# Patient Record
Sex: FEMALE | Race: WHITE | Employment: FULL TIME | URBAN - METROPOLITAN AREA
[De-identification: names, ages, dates, MRNs, and addresses within clinical notes are randomized per-mention and may not be internally consistent; named-entity substitution may affect disease eponyms.]

---

## 2024-06-07 PROBLEM — K80.20 CHOLELITHIASIS WITHOUT OBSTRUCTION: Status: ACTIVE | Noted: 2021-09-15

## 2024-06-07 PROBLEM — Z87.19 HISTORY OF GALLSTONES: Status: ACTIVE | Noted: 2021-09-15

## 2024-06-07 PROBLEM — O09.92 HIGH-RISK PREGNANCY IN SECOND TRIMESTER: Status: ACTIVE | Noted: 2024-06-07

## 2024-06-07 PROBLEM — O99.212 OBESITY AFFECTING PREGNANCY IN SECOND TRIMESTER: Status: ACTIVE | Noted: 2024-06-07

## 2024-06-07 PROBLEM — O99.012 ANEMIA AFFECTING PREGNANCY IN SECOND TRIMESTER: Status: ACTIVE | Noted: 2024-06-07

## 2024-06-19 ENCOUNTER — ROUTINE PRENATAL (OUTPATIENT)
Dept: OBGYN CLINIC | Age: 29
End: 2024-06-19
Payer: COMMERCIAL

## 2024-06-19 VITALS
DIASTOLIC BLOOD PRESSURE: 75 MMHG | SYSTOLIC BLOOD PRESSURE: 123 MMHG | BODY MASS INDEX: 34.41 KG/M2 | WEIGHT: 187 LBS | HEIGHT: 62 IN

## 2024-06-19 DIAGNOSIS — O99.012 ANEMIA AFFECTING PREGNANCY IN SECOND TRIMESTER: ICD-10-CM

## 2024-06-19 DIAGNOSIS — O99.212 OBESITY AFFECTING PREGNANCY IN SECOND TRIMESTER, UNSPECIFIED OBESITY TYPE: ICD-10-CM

## 2024-06-19 DIAGNOSIS — N88.8 CERVICAL MASS: ICD-10-CM

## 2024-06-19 DIAGNOSIS — Z3A.20 20 WEEKS GESTATION OF PREGNANCY: ICD-10-CM

## 2024-06-19 DIAGNOSIS — E66.9 MILD OBESITY: ICD-10-CM

## 2024-06-19 DIAGNOSIS — O09.92 HIGH-RISK PREGNANCY IN SECOND TRIMESTER: Primary | ICD-10-CM

## 2024-06-19 DIAGNOSIS — O09.899 TWO VESSEL UMBILICAL CORD IN SINGLETON PREGNANCY, ANTEPARTUM: ICD-10-CM

## 2024-06-19 PROBLEM — K80.20 CHOLELITHIASIS WITHOUT OBSTRUCTION: Status: RESOLVED | Noted: 2021-09-15 | Resolved: 2024-06-19

## 2024-06-19 PROBLEM — Z87.19 HISTORY OF GALLSTONES: Status: RESOLVED | Noted: 2021-09-15 | Resolved: 2024-06-19

## 2024-06-19 PROCEDURE — 76827 ECHO EXAM OF FETAL HEART: CPT | Performed by: OBSTETRICS & GYNECOLOGY

## 2024-06-19 PROCEDURE — 99205 OFFICE O/P NEW HI 60 MIN: CPT | Performed by: OBSTETRICS & GYNECOLOGY

## 2024-06-19 PROCEDURE — 76825 ECHO EXAM OF FETAL HEART: CPT | Performed by: OBSTETRICS & GYNECOLOGY

## 2024-06-19 PROCEDURE — 76811 OB US DETAILED SNGL FETUS: CPT | Performed by: OBSTETRICS & GYNECOLOGY

## 2024-06-19 PROCEDURE — 76817 TRANSVAGINAL US OBSTETRIC: CPT | Performed by: OBSTETRICS & GYNECOLOGY

## 2024-06-19 PROCEDURE — 93325 DOPPLER ECHO COLOR FLOW MAPG: CPT | Performed by: OBSTETRICS & GYNECOLOGY

## 2024-06-19 RX ORDER — PANTOPRAZOLE SODIUM 40 MG/1
TABLET, DELAYED RELEASE ORAL
COMMUNITY
Start: 2024-06-11

## 2024-06-19 RX ORDER — DOCUSATE SODIUM 100 MG/1
100 CAPSULE, LIQUID FILLED ORAL 2 TIMES DAILY
COMMUNITY

## 2024-06-19 RX ORDER — CEPHALEXIN 500 MG/1
CAPSULE ORAL
COMMUNITY
Start: 2024-05-20 | End: 2024-06-19

## 2024-06-19 RX ORDER — FERRIC MALTOL 30 MG/1
CAPSULE ORAL
COMMUNITY
Start: 2024-06-15

## 2024-06-19 RX ORDER — FAMOTIDINE 20 MG/1
TABLET, FILM COATED ORAL
COMMUNITY
Start: 2024-05-22 | End: 2024-06-19

## 2024-06-19 RX ORDER — NITROFURANTOIN 25; 75 MG/1; MG/1
CAPSULE ORAL
COMMUNITY
Start: 2024-05-21

## 2024-06-19 ASSESSMENT — PATIENT HEALTH QUESTIONNAIRE - PHQ9
SUM OF ALL RESPONSES TO PHQ QUESTIONS 1-9: 0
SUM OF ALL RESPONSES TO PHQ9 QUESTIONS 1 & 2: 0
2. FEELING DOWN, DEPRESSED OR HOPELESS: NOT AT ALL
SUM OF ALL RESPONSES TO PHQ QUESTIONS 1-9: 0
1. LITTLE INTEREST OR PLEASURE IN DOING THINGS: NOT AT ALL

## 2024-06-19 NOTE — PROGRESS NOTES
ASSESSMENT/PLAN:  Patient Active Problem List    Diagnosis Date Noted    Cervical mass 2024     Overview Note:     24: Presumed cervical fibroid noted with [facility] measuring 2 cm  24: (Patient changed OB care to [facility]) Presumed cervical fibroid seen measuring 4 x 3 x 5 cm on US with [facility]  24: Colposcopy performed for ASCUS-H Pap: JERICA 1-2 noted on exam, no biopsies collected  24: US at [facility] showed mass measuring 5 x 4 x 5 cm    24 [facility]: Referral to [facility] for \"cervical fibroid.\" Cervical mass measuring 7.04 x 6.8 x 4.24 cm. Appears highly vascular. See ultrasound report for full details.           Assessment & Plan Note:          The patient reports that she has had episodes of heavy vaginal bleeding earlier in the pregnancy which was presumed to be a subchorionic hemorrhage. She also has had episodes of rectal bleeding with passing large clots which was attributed to constipation. She reports history of abnormal Pap as above and had a recent colposcopy, but she denies any history of biopsies or cervical procedures.    We discussed the enlarging posterior cervical mass. This could represent a neoplasm (of cervical or rectal origin) or AVM. Differential also includes leiomyoma, but this mass appears much more vascular than would be expected in that case. A referral to GYN/ONC is recommended for a second opinion on this mass.     We discussed that an AVM may preclude vaginal delivery and may need surgical treatment if heavy bleeding is present. We also discussed that a neoplasm could require surgery or other treatment (radiation/chemo) and with treatment and management by Oncology. The patient has no future plans for child bearing.    In any case a  delivery may be required as this highly vascular mass has involved the cervix quite extensively.    Our plan is to maintain surveillance of the cervical mass with ultrasound and to refer to GYN/ONC for a 2nd

## 2024-06-20 PROBLEM — O99.332 TOBACCO SMOKING AFFECTING PREGNANCY IN SECOND TRIMESTER: Status: ACTIVE | Noted: 2024-06-20

## 2024-06-20 NOTE — ASSESSMENT & PLAN NOTE
The patient reports that she has had episodes of heavy vaginal bleeding earlier in the pregnancy which was presumed to be a subchorionic hemorrhage. She also has had episodes of rectal bleeding with passing large clots which was attributed to constipation. She reports history of abnormal Pap as above and had a recent colposcopy, but she denies any history of biopsies or cervical procedures.    We discussed the enlarging posterior cervical mass. This could represent a neoplasm (of cervical or rectal origin) or AVM. Differential also includes leiomyoma, but this mass appears much more vascular than would be expected in that case. A referral to GYN/ONC is recommended for a second opinion on this mass.     We discussed that an AVM may preclude vaginal delivery and may need surgical treatment if heavy bleeding is present. We also discussed that a neoplasm could require surgery or other treatment (radiation/chemo) and with treatment and management by Oncology. The patient has no future plans for child bearing.    In any case a  delivery may be required as this highly vascular mass has involved the cervix quite extensively.    Our plan is to maintain surveillance of the cervical mass with ultrasound and to refer to GYN/ONC for a 2nd opinion.    Bleeding precautions reviewed.  
     We discussed increased risk of FGR with SUA. Will maintain serial biometry throughout pregnancy. No genitourinary anomalies were identified.  
Anemia in pregnancy can be defined as follows, based mostly on data in nonpregnant individuals-   First trimester - Hemoglobin <11 g/dL   Second trimester - Hemoglobin <10.5 g/dL   Third trimester - Hemoglobin <11 g/dL   Postpartum - Hemoglobin <10 g/dL     Anemia in pregnant and postpartum individuals correlates with negative  outcomes.     Throughout pregnancy, iron deficiency anemia adversely affects the maternal and fetal well-being, and is linked to increased morbidity and fetal death. Affected mothers frequently experience breathing difficulties, fainting, tiredness, palpitations, and sleep difficulties. Complaints of pica are common. Iron deficiency during the first trimester, has a more negative impact on fetal growth than anemia developing later in pregnancy. This is also true for risk of premature labor. Later in pregnancy there is increased risk of developing  infection, preeclampsia, and severe postpartum hemorrhage. Adverse  outcomes include premature labor, intrauterine growth retardation, low birth weight, birth asphyxia, and  anemia. Postpartum anemia is linked with depression, emotional instability, feelings of poor quality of life. Additionally, it also may reduce quality/quantity of breast milk.     Lowered iron stores maternal iron stores result in lower fetal/ iron stores. This deficit may persist for up to one year and result in iron deficiency anemia. Such a state should be identified and treated promptly because of the possible long term consequences. Iron is essential for neural metabolism and functioning. Iron deficiency anemia results in changes in energy metabolism within the brain with defects in neurotransmitter function and myelination. Therefore, infants and young children with iron deficiency anemia are at risk of developmental difficulties involving cognitive, social-emotional, and adaptive functions. Other studies have documented delays in 
Low dose Aspirin  mg daily is recommended to be started at 12-16 weeks (some benefit seen with starting up to 28 weeks) for the prevention of preeclampsia  in high risk women. Consider stopping Aspirin at 36 weeks.  Recommend Vitamin D 2000IU daily and Calcium 1000mg daily to aid in protection of bones and teeth.  Recommend use of PNV daily with well-balanced diet.  Unless instructed otherwise, recommend continuation of physical activity throughout pregnancy.     
(ACOG) recommends weekly  surveillance for fetal well-being, starting by 34 weeks and 0 days of gestation for women with a prepregnancy BMI of 40 or higher and by 37 weeks and 0 days for women with a prepregnancy BMI of 35 to 39.    Patient counseled re need to optimize both maternal and fetal health by remaining active, limiting weight gain, and modifying food choices. She understands that if obesity worsens, then will need to meet with anesthesia and as a team determine safest location for delivery.     [facility] Guidelines for the Management of Obese Pregnant Patients  Patients with a Pre-pregnancy or First Trimester BMI ?50 or ?500 pounds OR who have BMI?45 with comorbidities should be referred for prenatal care and delivery to a group that delivers at a level 3 center ([facility], [facility], [facility]).    Patients who reach a BMI ?50 or 500 pounds during pregnancy should be:   Consider further evaluation with Obstructive Sleep Apnea evaluation  Referred back to [facility] for consultation if they are not already following patient.  Discussion with OB and M re safety of [place] [facility] [place] need to transfer to [facility] 3 center.